# Patient Record
Sex: FEMALE | Race: WHITE | URBAN - METROPOLITAN AREA
[De-identification: names, ages, dates, MRNs, and addresses within clinical notes are randomized per-mention and may not be internally consistent; named-entity substitution may affect disease eponyms.]

---

## 2021-07-11 ENCOUNTER — APPOINTMENT (OUTPATIENT)
Dept: GENERAL RADIOLOGY | Age: 66
End: 2021-07-11
Payer: COMMERCIAL

## 2021-07-11 ENCOUNTER — HOSPITAL ENCOUNTER (EMERGENCY)
Age: 66
Discharge: HOME | End: 2021-07-11
Attending: EMERGENCY MEDICINE
Payer: COMMERCIAL

## 2021-07-11 VITALS
OXYGEN SATURATION: 98 % | WEIGHT: 138 LBS | RESPIRATION RATE: 12 BRPM | HEART RATE: 95 BPM | HEIGHT: 64 IN | SYSTOLIC BLOOD PRESSURE: 191 MMHG | TEMPERATURE: 99.1 F | BODY MASS INDEX: 23.56 KG/M2 | DIASTOLIC BLOOD PRESSURE: 96 MMHG

## 2021-07-11 DIAGNOSIS — J18.9 PNEUMONIA OF BOTH LUNGS DUE TO INFECTIOUS ORGANISM, UNSPECIFIED PART OF LUNG: Primary | ICD-10-CM

## 2021-07-11 DIAGNOSIS — U07.1 COVID-19: ICD-10-CM

## 2021-07-11 PROCEDURE — 99283 EMERGENCY DEPT VISIT LOW MDM: CPT

## 2021-07-11 PROCEDURE — 71046 X-RAY EXAM CHEST 2 VIEWS: CPT

## 2021-07-11 RX ORDER — ALBUTEROL SULFATE 90 UG/1
2 AEROSOL, METERED RESPIRATORY (INHALATION) EVERY 4 HOURS PRN
Qty: 1 INHALER | Refills: 0 | Status: SHIPPED | OUTPATIENT
Start: 2021-07-11

## 2021-07-11 RX ORDER — PROMETHAZINE HYDROCHLORIDE AND CODEINE PHOSPHATE 6.25; 1 MG/5ML; MG/5ML
5 SYRUP ORAL 4 TIMES DAILY PRN
Qty: 120 ML | Refills: 0 | Status: SHIPPED | OUTPATIENT
Start: 2021-07-11 | End: 2021-07-18

## 2021-07-11 NOTE — ED PROVIDER NOTES
[facility] Emergency Department      Pt Name: Maria Hood  MRN: [id number]  [place] 1955  Date of evaluation: 7/11/2021  Provider: Adriana Fischer MD  I independently performed a history and physical on Maria Hood. All diagnostic, treatment, and disposition decisions were made by myself in conjunction with the advanced practice provider. HPI: Maria Hood presented with   Chief Complaint   Patient presents with    Positive For Covid-19    Cough     \"cant stand the cough\"    Headache     at times, \"from coughing\"     Maria Hood has a past medical history of COVID-19. She has a past surgical history that includes Dilation and curettage of uterus. No current facility-administered medications on file prior to encounter. Current Outpatient Medications on File Prior to Encounter   Medication Sig Dispense Refill    acetaminophen-codeine (TYLENOL/CODEINE #3) 300-30 MG per tablet Take 1 tablet by mouth every 4 hours as needed for Pain. 20 tablet 0     PHYSICAL EXAM  Vitals: BP (!) 191/96   Pulse 95   Temp 99.1 °F (37.3 °C) (Oral)   Resp 12   Ht 5' 4\" (1.626 m)   Wt 138 lb (62.6 kg)   SpO2 98%   BMI 23.69 kg/m²   Constitutional:  74 y.o. female alert  HENT:  Atraumatic, oral mucosa moist  Neck:  No visible JVD, supple  Chest/Lungs:  Respiratory effort normal, clear, regular  Abdomen:  Non-distended  Back:  No gross deformity  Extremities:  Normal tone and perfusion, no edema    Medical Decision Making and Plan: Briefly, this is an 74 y. o.female who presented with concern for cough, recent diagnosis of COVID infection. She is oxygenating normally and actually denies any shortness of breath. Her primary concern is controlling the amount of coughing that she is doing. We discussed symptomatic treatment, fluids, and rest, continue quarantine. Maria Hood was given appropriate discharge instructions. Referral to follow up provider.      For further details of [facility] Emergency Department encounter, please see documentation by advanced practice provider Renetta Dan. RADIOLOGY:     Plain x-rays were viewed by me:   XR CHEST (2 VW)   Final Result   Mild multifocal bilateral pneumonia. Discharge Medication List as of 7/11/2021 10:46 AM      START taking these medications    Details   albuterol sulfate  (90 Base) MCG/ACT inhaler Inhale 2 puffs into the lungs every 4 hours as needed for Wheezing, Disp-1 Inhaler, R-0, DAWNormal      promethazine-codeine (PHENERGAN WITH CODEINE) 6.25-10 MG/5ML syrup Take 5 mLs by mouth 4 times daily as needed for Cough for up to 7 days. , Disp-120 mL, R-0Normal           FINAL IMPRESSION:    1. Pneumonia of both lungs due to infectious organism, unspecified part of lung    2.  COVID-19       DISPOSITION Decision To Discharge 07/11/2021 10:35:05 AM       Rosie Shelton MD  07/11/21 5620

## 2021-07-11 NOTE — ED PROVIDER NOTES
[facility]        Pt Name: Juan Moody  MRN: [id number]  [place] 1955  Date of evaluation: 7/11/2021  Provider: Jes Rivas PA-C  PCP: No primary care provider on file. Note Started: 10:14 AM EDT        I have seen and evaluated this patient with my supervising physician Amanda Owusu, *. CHIEF COMPLAINT       Chief Complaint   Patient presents with    Positive For Covid-19    Cough     \"cant stand the cough\"    Headache     at times, \"from coughing\"       HISTORY OF PRESENT ILLNESS   (Location, Timing/Onset, Context/Setting, Quality, Duration, Modifying Factors, Severity, Associated Signs and Symptoms)  Note limiting factors. Chief Complaint: Cough    Juan Moody is a 74 y.o. female who presents to the emergency department with a chief complaint of cough. Patient states is been ongoing for the past 1.5 weeks. Was seen at urgent care 4 days ago and was started on Zithromax and steroids and given some dextromethorphan cough syrup. She is mostly complaining about cough. She states if she is just sitting still she feels fine but anytime she gets up and walks around she has a persistent dry cough. She still been running some fevers off and on. She was seen at a [address] clinic 2 days ago and states she was Covid positive. She did not get her COVID-19 vaccination. She denies chest pain, shortness of breath, nausea, vomiting, leg swelling, diarrhea or any other symptoms. States that she is just sitting and relaxing she feels fine. Nursing Notes were all reviewed and agreed with or any disagreements were addressed in the HPI. REVIEW OF SYSTEMS    (2-9 systems for level 4, 10 or more for level 5)     Review of Systems    Positives and Pertinent negatives as per HPI. Except as noted above in the ROS, all other systems were reviewed and negative.        PAST MEDICAL HISTORY     Past Medical History:   Diagnosis Date    COVID-19          SURGICAL HISTORY     Past Surgical History:   Procedure Laterality Date    DILATION AND CURETTAGE OF UTERUS           CURRENTMEDICATIONS       Discharge Medication List as of 7/11/2021 10:46 AM      CONTINUE these medications which have NOT CHANGED    Details   acetaminophen-codeine (TYLENOL/CODEINE #3) 300-30 MG per tablet Take 1 tablet by mouth every 4 hours as needed for Pain., Disp-20 tablet, R-0               ALLERGIES     Patient has no known allergies. FAMILYHISTORY     History reviewed. No pertinent family history. SOCIAL HISTORY       Social History     Tobacco Use    Smoking status: Never Smoker   Vaping Use    Vaping Use: Never used   Substance Use Topics    Alcohol use: Yes    Drug use: Not Currently       SCREENINGS             PHYSICAL EXAM    (up to 7 for level 4, 8 or more for level 5)     ED Triage Vitals [07/11/21 0905]   BP Temp Temp Source Pulse Resp SpO2 Height Weight   (!) 191/96 99.1 °F (37.3 °C) Oral 95 12 98 % 5' 4\" (1.626 m) 138 lb (62.6 kg)       Physical Exam  Vitals and nursing note reviewed. Constitutional:       Appearance: She is well-developed. She is not diaphoretic. HENT:      Head: Atraumatic. Nose: Nose normal.   Eyes:      General:         Right eye: No discharge. Left eye: No discharge. Cardiovascular:      Rate and Rhythm: Normal rate and regular rhythm. Heart sounds: No murmur heard. No friction rub. No gallop. Pulmonary:      Effort: Pulmonary effort is normal. No respiratory distress. Breath sounds: No stridor. No wheezing, rhonchi or rales. Abdominal:      General: Bowel sounds are normal. There is no distension. Palpations: Abdomen is soft. There is no mass. Tenderness: There is no abdominal tenderness. There is no guarding or rebound. Hernia: No hernia is present. Musculoskeletal:         General: No swelling. Normal range of motion.       Cervical back: Normal range of motion. Right lower leg: No edema. Left lower leg: No edema. Skin:     General: Skin is warm and dry. Findings: No erythema or rash. Neurological:      Mental Status: She is alert and oriented to person, place, and time. Cranial Nerves: No cranial nerve deficit. Psychiatric:         Behavior: Behavior normal.         DIAGNOSTIC RESULTS   LABS:    Labs Reviewed - No data to display    When ordered only abnormal lab results are displayed. All other labs were within normal range or not returned as of this dictation. EKG: When ordered, EKG's are interpreted by the Emergency Department Physician in the absence of a cardiologist.  Please see their note for interpretation of EKG. RADIOLOGY:   Non-plain film images such as CT, Ultrasound and MRI are read by the radiologist. Plain radiographic images are visualized and preliminarily interpreted by the ED Provider with the below findings:        Interpretation per the Radiologist below, if available at the time of this note:    XR CHEST (2 VW)   Final Result   Mild multifocal bilateral pneumonia. XR CHEST (2 VW)    Result Date: 7/11/2021  EXAMINATION: TWO XRAY VIEWS OF THE CHEST 7/11/2021 9:28 am COMPARISON: None. HISTORY: ORDERING SYSTEM PROVIDED HISTORY: cough TECHNOLOGIST PROVIDED HISTORY: Reason for exam:->cough Acuity: Unknown FINDINGS: There are patchy bilateral perihilar and lower lobe airspace opacities. There is no pneumothorax or effusion. Heart size and vascularity are normal.     Mild multifocal bilateral pneumonia.            PROCEDURES   Unless otherwise noted below, none     Procedures    CRITICAL CARE TIME   N/A    CONSULTS:  None      EMERGENCY DEPARTMENT COURSE and DIFFERENTIAL DIAGNOSIS/MDM:   Vitals:    Vitals:    07/11/21 0905   BP: (!) 191/96   Pulse: 95   Resp: 12   Temp: 99.1 °F (37.3 °C)   TempSrc: Oral   SpO2: 98%   Weight: 138 lb (62.6 kg)   Height: 5' 4\" (1.626 m)       Patient was given the following medications:  Medications - No data to display        Patient presented with cough when she is walking around. Has Covid that was diagnosed 2 days ago. She is already on Medrol Dosepak and Zithromax. She is nontoxic in appearance. Lung sounds are clear to auscultation. She is not tachycardic or hypoxic. She denies any symptoms just sitting there. X-ray imaging reveals mild multifocal pneumonia. Low suspicion for pulmonary embolus, pneumonia requiring IV antibiotics. Do not believe that she requires admission for Decadron nor any further treatment for COVID-19. Patient will be discharged home with some Phenergan and codeine and inhaler. Return here for any worsening of symptoms or problems at home. She was stable time of discharge. FINAL IMPRESSION      1. Pneumonia of both lungs due to infectious organism, unspecified part of lung    2. COVID-19          DISPOSITION/PLAN   DISPOSITION Decision To Discharge 07/11/2021 10:35:05 AM      PATIENT REFERRED TO:  No follow-up provider specified. DISCHARGE MEDICATIONS:  Discharge Medication List as of 7/11/2021 10:46 AM      START taking these medications    Details   albuterol sulfate  (90 Base) MCG/ACT inhaler Inhale 2 puffs into the lungs every 4 hours as needed for Wheezing, Disp-1 Inhaler, R-0, DAWNormal      promethazine-codeine (PHENERGAN WITH CODEINE) 6.25-10 MG/5ML syrup Take 5 mLs by mouth 4 times daily as needed for Cough for up to 7 days. , Disp-120 mL, R-0Normal             DISCONTINUED MEDICATIONS:  Discharge Medication List as of 7/11/2021 10:46 AM                 (Please note that portions of this note were completed with a voice recognition program.  Efforts were made to edit the dictations but occasionally words are mis-transcribed.)    Carmen Hobson PA-C (electronically signed)           Carmen Hobson PA-C  07/11/21 1101

## 2021-07-12 ENCOUNTER — CARE COORDINATION (OUTPATIENT)
Dept: OTHER | Facility: CLINIC | Age: 66
End: 2021-07-12

## 2021-07-13 ENCOUNTER — CARE COORDINATION (OUTPATIENT)
Dept: OTHER | Facility: CLINIC | Age: 66
End: 2021-07-13

## 2021-07-13 NOTE — CARE COORDINATION
[facility] ED Follow Up Call    2021    Patient: Maria Hood Patient : 1955   MRN: [id number]  Reason for Admission: COVID 19 with Bilateral Pneumonia  Discharge Date: 21    Date/Time:  2021 1:26 PM  Attempted to reach patient by telephone. Call within 2 business days of discharge: Yes Left HIPPA compliant message requesting a return call. Will attempt to reach patient again. Due to patient not having a PCP listed on file ACM will attempt 3rd outreach next week. ACM also sent UTR letter via mail.

## 2021-07-13 NOTE — LETTER
July 13, 2021      Dear Lee Griffin,      My name is Yessy Hood, RN, Associate Care Manager for [facility] and I have been trying to reach you. I would like to support you during this difficult and stressful time. The COVID-19 pandemic has impacted all of us in different ways and our department is here to support issues that may require services including: navigating protocols and services; addressing high risk factors; providing healthcare oversight and care coordination; or providing social support. The Associate Care Management (ACM) program is a free-of-charge service provided to our associates and their family members covered by the [facility]. We serve our ministry as an additional resource providing confidential assistance to symptomatic patients, and education to associates and family members who are at risk for complications due to RVIRU-04; especially for those over 61years of age, or have a chronic condition, such as heart disease, diabetes, or lung disease. If appropriate, we enroll symptomatic or exposed patients in a free symptom tracker that is accessed from a Smart phone or computer. This will be a way that I will be notified with how patients are feeling each day. If symptoms worsen the program will alert me or my team to give you a call, if it's after hours it will give you the after-hours number to call. [facility] is dedicated to empowering the good health of its community and improving the quality of care and care experiences for associates and their families. We are committed to safeguarding patient confidentiality and privacy, assuring that every associate has the respect he or she deserves in managing their health.  The information shared with your care manager will not be shared with anyone else aside from those you identify as part of your care team, and will only be used to assist you with any identified care needs. Please contact me so that I can provide the services that you need at this challenging time. Resources that are available to you:    Visit mc[employer]EL or download the [employer] donna for free from the donna store. Use the Coupon Code [id number] for your free COVID-19 visit. Please note this service is for non-emergency COVID-19 visits only. If you are having a medical emergency, call 911 immediately.  Conduit exposure line [phone]   The CDC and your local  department of health        Sincerely,     Ronaldo Becerril RN, BSN   Associate Care Manager   [phone]  [email or web address]

## 2021-07-21 ENCOUNTER — CARE COORDINATION (OUTPATIENT)
Dept: OTHER | Facility: CLINIC | Age: 66
End: 2021-07-21

## 2021-07-21 NOTE — CARE COORDINATION
[facility] ED Follow Up Call    2021    Patient: Yung Machado Patient : 1955   MRN: [id number]  Reason for Admission: Bilateral Pneumonia r/t COVID 19  Discharge Date: 21    ACM attempted to reach patient for Care Transitions call. HIPAA compliant message left requesting a return phone call at patients convenience. No further outreach scheduled with this ACM, ACM will sign off care team at this time. Episode of Care resolved. Patient has this ACM's contact information if future needs arise. Final UTR Letter Mailed.